# Patient Record
Sex: MALE | Race: BLACK OR AFRICAN AMERICAN | Employment: OTHER | URBAN - METROPOLITAN AREA
[De-identification: names, ages, dates, MRNs, and addresses within clinical notes are randomized per-mention and may not be internally consistent; named-entity substitution may affect disease eponyms.]

---

## 2017-03-01 ENCOUNTER — HOSPITAL ENCOUNTER (OUTPATIENT)
Dept: INFUSION THERAPY | Age: 77
Discharge: HOME | End: 2017-03-01
Payer: MEDICARE

## 2017-03-01 ENCOUNTER — OFFICE VISIT (OUTPATIENT)
Dept: ONCOLOGY | Age: 77
End: 2017-03-01

## 2017-03-01 VITALS
DIASTOLIC BLOOD PRESSURE: 83 MMHG | RESPIRATION RATE: 16 BRPM | HEART RATE: 59 BPM | TEMPERATURE: 98 F | SYSTOLIC BLOOD PRESSURE: 148 MMHG

## 2017-03-01 VITALS
WEIGHT: 164.6 LBS | BODY MASS INDEX: 23.56 KG/M2 | SYSTOLIC BLOOD PRESSURE: 198 MMHG | OXYGEN SATURATION: 97 % | HEART RATE: 60 BPM | TEMPERATURE: 97.5 F | RESPIRATION RATE: 20 BRPM | HEIGHT: 70 IN | DIASTOLIC BLOOD PRESSURE: 100 MMHG

## 2017-03-01 DIAGNOSIS — D72.819 LEUKOPENIA, UNSPECIFIED TYPE: ICD-10-CM

## 2017-03-01 DIAGNOSIS — D69.6 THROMBOCYTOPENIA (HCC): Primary | ICD-10-CM

## 2017-03-01 LAB
ALBUMIN SERPL BCP-MCNC: 3.9 G/DL (ref 3.5–5)
ALBUMIN/GLOB SERPL: 1.2 {RATIO} (ref 1.1–2.2)
ALP SERPL-CCNC: 81 U/L (ref 45–117)
ALT SERPL-CCNC: 38 U/L (ref 12–78)
ANION GAP BLD CALC-SCNC: 5 MMOL/L (ref 5–15)
APTT PPP: 28.6 SEC (ref 22.1–32.5)
AST SERPL W P-5'-P-CCNC: 26 U/L (ref 15–37)
BASOPHILS # BLD AUTO: 0 K/UL (ref 0–0.1)
BASOPHILS # BLD: 1 % (ref 0–1)
BILIRUB SERPL-MCNC: 0.7 MG/DL (ref 0.2–1)
BUN SERPL-MCNC: 15 MG/DL (ref 6–20)
BUN/CREAT SERPL: 15 (ref 12–20)
CALCIUM SERPL-MCNC: 9 MG/DL (ref 8.5–10.1)
CHLORIDE SERPL-SCNC: 104 MMOL/L (ref 97–108)
CO2 SERPL-SCNC: 31 MMOL/L (ref 21–32)
CREAT SERPL-MCNC: 1.01 MG/DL (ref 0.7–1.3)
CRP SERPL-MCNC: <0.29 MG/DL
EOSINOPHIL # BLD: 0.1 K/UL (ref 0–0.4)
EOSINOPHIL NFR BLD: 3 % (ref 0–7)
ERYTHROCYTE [DISTWIDTH] IN BLOOD BY AUTOMATED COUNT: 12.8 % (ref 11.5–14.5)
ERYTHROCYTE [SEDIMENTATION RATE] IN BLOOD: 8 MM/HR (ref 0–20)
FIBRINOGEN PPP-MCNC: 329 MG/DL (ref 200–475)
FOLATE SERPL-MCNC: 28.7 NG/ML (ref 5–21)
GLOBULIN SER CALC-MCNC: 3.3 G/DL (ref 2–4)
GLUCOSE SERPL-MCNC: 91 MG/DL (ref 65–100)
HCT VFR BLD AUTO: 41.1 % (ref 36.6–50.3)
HGB BLD-MCNC: 13 G/DL (ref 12.1–17)
INR PPP: 1.2 (ref 0.9–1.1)
LYMPHOCYTES # BLD AUTO: 32 % (ref 12–49)
LYMPHOCYTES # BLD: 1.3 K/UL (ref 0.8–3.5)
MCH RBC QN AUTO: 30.1 PG (ref 26–34)
MCHC RBC AUTO-ENTMCNC: 31.6 G/DL (ref 30–36.5)
MCV RBC AUTO: 95.1 FL (ref 80–99)
MONOCYTES # BLD: 0.3 K/UL (ref 0–1)
MONOCYTES NFR BLD AUTO: 8 % (ref 5–13)
NEUTS SEG # BLD: 2.3 K/UL (ref 1.8–8)
NEUTS SEG NFR BLD AUTO: 56 % (ref 32–75)
PLATELET # BLD AUTO: 107 K/UL (ref 150–400)
POTASSIUM SERPL-SCNC: 3.9 MMOL/L (ref 3.5–5.1)
PROT SERPL-MCNC: 7.2 G/DL (ref 6.4–8.2)
PROTHROMBIN TIME: 11.7 SEC (ref 9–11.1)
RBC # BLD AUTO: 4.32 M/UL (ref 4.1–5.7)
SODIUM SERPL-SCNC: 140 MMOL/L (ref 136–145)
THERAPEUTIC RANGE,PTTT: NORMAL SECS (ref 58–77)
VIT B12 SERPL-MCNC: 1725 PG/ML (ref 211–911)
WBC # BLD AUTO: 4.2 K/UL (ref 4.1–11.1)

## 2017-03-01 PROCEDURE — 85384 FIBRINOGEN ACTIVITY: CPT | Performed by: INTERNAL MEDICINE

## 2017-03-01 PROCEDURE — 80074 ACUTE HEPATITIS PANEL: CPT | Performed by: INTERNAL MEDICINE

## 2017-03-01 PROCEDURE — 82607 VITAMIN B-12: CPT | Performed by: INTERNAL MEDICINE

## 2017-03-01 PROCEDURE — 87389 HIV-1 AG W/HIV-1&-2 AB AG IA: CPT | Performed by: INTERNAL MEDICINE

## 2017-03-01 PROCEDURE — 36415 COLL VENOUS BLD VENIPUNCTURE: CPT | Performed by: INTERNAL MEDICINE

## 2017-03-01 PROCEDURE — 85652 RBC SED RATE AUTOMATED: CPT | Performed by: INTERNAL MEDICINE

## 2017-03-01 PROCEDURE — 82746 ASSAY OF FOLIC ACID SERUM: CPT | Performed by: INTERNAL MEDICINE

## 2017-03-01 PROCEDURE — 86038 ANTINUCLEAR ANTIBODIES: CPT | Performed by: INTERNAL MEDICINE

## 2017-03-01 PROCEDURE — 80053 COMPREHEN METABOLIC PANEL: CPT | Performed by: INTERNAL MEDICINE

## 2017-03-01 PROCEDURE — 86140 C-REACTIVE PROTEIN: CPT | Performed by: INTERNAL MEDICINE

## 2017-03-01 PROCEDURE — 85025 COMPLETE CBC W/AUTO DIFF WBC: CPT | Performed by: INTERNAL MEDICINE

## 2017-03-01 PROCEDURE — 85730 THROMBOPLASTIN TIME PARTIAL: CPT | Performed by: INTERNAL MEDICINE

## 2017-03-01 PROCEDURE — 85610 PROTHROMBIN TIME: CPT | Performed by: INTERNAL MEDICINE

## 2017-03-01 PROCEDURE — 86677 HELICOBACTER PYLORI ANTIBODY: CPT | Performed by: INTERNAL MEDICINE

## 2017-03-01 RX ORDER — METOPROLOL TARTRATE 100 MG/1
TABLET ORAL 2 TIMES DAILY
COMMUNITY
End: 2019-04-02

## 2017-03-01 RX ORDER — LISINOPRIL 2.5 MG/1
TABLET ORAL DAILY
COMMUNITY
End: 2019-04-02

## 2017-03-01 RX ORDER — ATORVASTATIN CALCIUM 10 MG/1
TABLET, FILM COATED ORAL
COMMUNITY

## 2017-03-01 RX ORDER — ASPIRIN 81 MG/1
TABLET ORAL DAILY
COMMUNITY
End: 2017-03-29 | Stop reason: ALTCHOICE

## 2017-03-01 NOTE — PROGRESS NOTES
[address] [facility] [place] at [facility]  [phone]    Hematology / Oncology Consult    Reason for Visit:   Scott Perdomo is a 68 y.o. male who is seen in consultation at the request of Dr. Angeles Andrews for evaluation of leukopenia, thrombocytopenia. History of Present Illness:   Scott Perdomo is a pleasant 68 y.o. male who presents today for evaluation of thrombocytopenia and leukopenia. He reports first hearing about low platelets about 5 years ago, but has not had any evaluation for this previously. He saw his PCP recently for a routine physical, and labwork demonstrated mildly low platelets. Repeat testing confirmed this. He reports good energy. No fevers, chills, night sweats, adenopathy. He did recently have shingles, since resolved. No bleeding. No rash. He has been eating a stricter vegetarian diet and has lost 7-8 pounds as a result. He takes B12 supplements, as well as other supplements. Denies etoh. Denies family history of hematologic disorders. Past Medical History:   Diagnosis Date    CAD (coronary artery disease)     HTN (hypertension)       Past Surgical History:   Procedure Laterality Date    CARDIAC SURG PROCEDURE UNLIST        Social History   Substance Use Topics    Smoking status: Never Smoker    Smokeless tobacco: Not on file    Alcohol use No      History reviewed. No pertinent family history. Current Outpatient Prescriptions   Medication Sig    metoprolol tartrate (LOPRESSOR) 100 mg IR tablet Take  by mouth two (2) times a day.  lisinopril (PRINIVIL, ZESTRIL) 2.5 mg tablet Take  by mouth daily.  aspirin delayed-release 81 mg tablet Take  by mouth daily.  atorvastatin (LIPITOR) 10 mg tablet Take  by mouth daily. No current facility-administered medications for this visit. No Known Allergies     Review of Systems: A complete review of systems was obtained, negative except as described above.     Physical Exam:     Visit Vitals    BP (!) 198/100 (BP 1 Location: Right arm, BP Patient Position: Sitting)    Pulse 60    Temp 97.5 °F (36.4 °C) (Axillary)    Resp 20    Ht 5' 10\" (1.778 m)    Wt 164 lb 9.6 oz (74.7 kg)    SpO2 97%    BMI 23.62 kg/m2     General: No distress  Eyes: PERRLA, anicteric sclerae  HENT: Atraumatic, OP clear  Neck: Supple  Lymphatic: No cervical, supraclavicular, or inguinal adenopathy  Respiratory: CTAB, normal respiratory effort  CV: Normal rate, regular rhythm, no murmurs, no peripheral edema  GI: Soft, nontender, nondistended, no masses, no hepatomegaly, no splenomegaly  MS: Normal gait and station. Digits without clubbing or cyanosis. Skin: No rashes, ecchymoses, or petechiae. Normal temperature, turgor, and texture. Psych: Alert, oriented, appropriate affect, normal judgment/insight    Results:     2/12/2017:  WBC: 3.69  HGB: 15  PLT: 125    2/6/2017:  WBC: 4.35  HGB: 14.2  PLT: 116  Creatinine: 0.9  TBili: 0.7  PSA: 1.174    Records reviewed and summarized above. Assessment:   1) Thrombocytopenia  Mild, uncertain etiology. The differential diagnosis for thrombocytopenia is broad, and can be generally broken down into three groups: Decreased Production, Increased Consumption, or Pseudothrombocytopenia. Causes of decreased production include infection, HIV, etoh toxicity, B12 and folate deficiency, and primary bone marrow disorders. Causes of increased consumption include various drugs, DIC, TTP, ITP, APLA, splenomegaly, and physical destruction related to valvular disease, aneurysms, etc.  Pseudothrombocytopenia is a laboratory artifact and can be ruled out by a smear review. I will start by obtaining some labs to explore some of these possibilities. I will additional order an abdominal US to evaluate the liver and spleen.       2) Leukopenia  The most common etiology, especially in patients without recurrent infections, would be benign ethnic neutropenia, as s commonly have a lower WBC than Caucasians. There is no evidence by history of an inherited neutropenia, no history of chronic or recurrent infection, and no medication that is likely to be contributing. I will explore some other possibilities today with labwork. Plan:     · Labs today  · US abdomen  · Return to see me in a few weeks to review results    I appreciate the opportunity to participate in Mr. Justice Frias's care.     Signed By: Ashlee Philip MD     March 1, 2017

## 2017-03-01 NOTE — MR AVS SNAPSHOT
Visit Information Date & Time Provider Department Dept. Phone Encounter #  
 3/1/2017 11:00 AM MD Albin Gonzáles Oncology at [facility] [phone] Follow-up Instructions Return for nithin Go results, tcp fu. Your Appointments 3/29/2017 11:00 AM  
ESTABLISHED PATIENT with MD Albin Gonzáles Oncology at [facility]) Appt Note: Donavan, lab results, tcp fu  
 [address] [facility], [address] [facility] [address]  
[phone]  
  
   
 [address] [facility], [address] [facility] Upcoming Health Maintenance Date Due DTaP/Tdap/Td series (1 - Tdap) 3/28/1961 ZOSTER VACCINE AGE 60> 3/28/2000 GLAUCOMA SCREENING Q2Y 3/28/2005 Pneumococcal 65+ Low/Medium Risk (1 of 2 - PCV13) 3/28/2005 MEDICARE YEARLY EXAM 3/28/2005 INFLUENZA AGE 9 TO ADULT 8/1/2016 Allergies as of 3/1/2017  Review Complete On: 3/1/2017 By: Ami Quintero No Known Allergies Current Immunizations  Never Reviewed No immunizations on file. Not reviewed this visit You Were Diagnosed With   
  
 Codes Comments Thrombocytopenia ([facility]    -  Primary ICD-10-CM: D69.6 ICD-9-CM: 287.5 Leukopenia, unspecified type     ICD-10-CM: D72.819 ICD-9-CM: 288.50 Vitals BP  
  
  
  
  
  
 (!) 198/100 (BP 1 Location: Right arm, BP Patient Position: Sitting) Vitals History BMI and BSA Data Body Mass Index Body Surface Area  
 23.62 kg/m 2 1.92 m 2 Preferred Pharmacy Pharmacy Name Phone [facility]in[facility] Strickland [address], [address] [phone] Your Updated Medication List  
  
   
This list is accurate as of: 3/1/17 12:19 PM.  Always use your most recent med list.  
  
  
  
  
 aspirin delayed-release 81 mg tablet Take  by mouth daily. LIPITOR 10 mg tablet Generic drug:  atorvastatin Take  by mouth daily. lisinopril 2.5 mg tablet Commonly known as:  Carey Escort Take  by mouth daily. metoprolol tartrate 100 mg IR tablet Commonly known as:  LOPRESSOR Take  by mouth two (2) times a day. Follow-up Instructions Return for Donavan, lab results, tcp fu. To-Do List   
 Around 03/03/2017 Imaging:  US ABD COMP Introducing [facility] [place] & HEALTH SERVICES! [facility] introduces Beamly patient portal. Now you can access parts of your medical record, email your doctor's office, and request medication refills online. 1. In your internet browser, go to [email or web address] 2. Click on the First Time User? Click Here link in the Sign In box. You will see the New Member Sign Up page. 3. Enter your Beamly Access Code exactly as it appears below. You will not need to use this code after youve completed the sign-up process. If you do not sign up before the expiration date, you must request a new code. · Beamly Access Code: [email or web address] Expires: 5/30/2017 12:19 PM 
 
4. Enter the last four digits of your Social Security Number (xxxx) and Date of Birth (mm/dd/yyyy) as indicated and click Submit. You will be taken to the next sign-up page. 5. Create a Beamly ID. This will be your Beamly login ID and cannot be changed, so think of one that is secure and easy to remember. 6. Create a Beamly password. You can change your password at any time. 7. Enter your Password Reset Question and Answer. This can be used at a later time if you forget your password. 8. Enter your e-mail address. You will receive e-mail notification when new information is available in [address] 9. Click Sign Up. You can now view and download portions of your medical record. 10. Click the Download Summary menu link to download a portable copy of your medical information.  
 
If you have questions, please visit the Frequently Asked Questions section of the [employer]. Remember, [employer] is NOT to be used for urgent needs. For medical emergencies, dial 911. Now available from your iPhone and Android! Please provide this summary of care documentation to your next provider. Your primary care clinician is listed as Laxmi Hayes. If you have any questions after today's visit, please call [phone].

## 2017-03-03 LAB
ANA TITR SER IF: NEGATIVE {TITER}
H PYLORI IGA SER-ACNC: <9 UNITS (ref 0–8.9)
H PYLORI IGG SER IA-ACNC: 1.6 U/ML (ref 0–0.8)
HAV IGM SERPL QL IA: NONREACTIVE
HBV CORE IGM SER QL: NONREACTIVE
HBV SURFACE AG SER QL: <0.1 INDEX
HBV SURFACE AG SER QL: NEGATIVE
HCV AB SERPL QL IA: NONREACTIVE
HCV COMMENT,HCGAC: NORMAL
HIV 1+2 AB+HIV1 P24 AG SERPL QL IA: NONREACTIVE
HIV12 RESULT COMMENT, HHIVC: NORMAL
PERIPHERAL SMEAR,PSM: NORMAL
SP1: NORMAL
SP2: NORMAL
SP3: NORMAL

## 2017-03-15 ENCOUNTER — HOSPITAL ENCOUNTER (OUTPATIENT)
Dept: ULTRASOUND IMAGING | Age: 77
Discharge: HOME | End: 2017-03-15
Attending: INTERNAL MEDICINE
Payer: MEDICARE

## 2017-03-15 DIAGNOSIS — D72.819 LEUKOPENIA, UNSPECIFIED TYPE: ICD-10-CM

## 2017-03-15 DIAGNOSIS — D69.6 THROMBOCYTOPENIA (HCC): ICD-10-CM

## 2017-03-15 PROCEDURE — 76700 US EXAM ABDOM COMPLETE: CPT

## 2017-03-29 ENCOUNTER — OFFICE VISIT (OUTPATIENT)
Dept: ONCOLOGY | Age: 77
End: 2017-03-29

## 2017-03-29 ENCOUNTER — TELEPHONE (OUTPATIENT)
Dept: ONCOLOGY | Age: 77
End: 2017-03-29

## 2017-03-29 VITALS
HEART RATE: 67 BPM | OXYGEN SATURATION: 98 % | RESPIRATION RATE: 20 BRPM | DIASTOLIC BLOOD PRESSURE: 101 MMHG | BODY MASS INDEX: 23.05 KG/M2 | HEIGHT: 70 IN | SYSTOLIC BLOOD PRESSURE: 188 MMHG | WEIGHT: 161 LBS | TEMPERATURE: 97.5 F

## 2017-03-29 DIAGNOSIS — D69.6 THROMBOCYTOPENIA (HCC): Primary | ICD-10-CM

## 2017-03-29 DIAGNOSIS — I48.91 ATRIAL FIBRILLATION, UNSPECIFIED TYPE (HCC): Primary | ICD-10-CM

## 2017-03-29 DIAGNOSIS — D72.819 LEUKOPENIA, UNSPECIFIED TYPE: ICD-10-CM

## 2017-03-29 DIAGNOSIS — A04.8 H. PYLORI INFECTION: ICD-10-CM

## 2017-03-29 RX ORDER — CLARITHROMYCIN 500 MG/1
500 TABLET, FILM COATED ORAL 2 TIMES DAILY
Qty: 28 TAB | Refills: 0 | Status: SHIPPED | OUTPATIENT
Start: 2017-03-29 | End: 2017-04-12

## 2017-03-29 RX ORDER — AMOXICILLIN 500 MG/1
1000 CAPSULE ORAL 2 TIMES DAILY
Qty: 56 CAP | Refills: 0 | Status: SHIPPED | OUTPATIENT
Start: 2017-03-29 | End: 2017-04-12

## 2017-03-29 RX ORDER — OMEPRAZOLE 20 MG/1
20 CAPSULE, DELAYED RELEASE ORAL 2 TIMES DAILY
Qty: 28 CAP | Refills: 0 | Status: SHIPPED | OUTPATIENT
Start: 2017-03-29 | End: 2017-04-12

## 2017-03-29 NOTE — TELEPHONE ENCOUNTER
[facility]  Medical Oncology at [facility]    03/29/17- Per Dr. Ayush Martin, there's a drug interaction between apixaban and clarithromycin. Need to find out why patient is taking apixaban. Voicemail left on both home and cell numbers requesting a return call from patient when available. 03/30/17- Attempted to call patient/wife again, voicemail left on home phone. No voicemail set up on patient's wife's home number, unable to leave a message. Dr. Ayush Martin notified.

## 2017-03-29 NOTE — MR AVS SNAPSHOT
Visit Information Date & Time Provider Department Dept. Phone Encounter #  
 3/29/2017 11:00 AM MD Michelle Torresnhaverosana Oncology at [address] [facility] [id number] Follow-up Instructions Return for Raddin, tcp fu, leukopenia fu. Your Appointments 7/6/2017 10:00 AM  
ESTABLISHED PATIENT with MD Albin Torres Oncology at [address] [facility] [address] [place] [address]) Appt Note: 3 mo fu, Raddin [address] [facility], [address] [facility] [address] [phone]  
  
   
 [address] [facility], [address] [place] [address] Upcoming Health Maintenance Date Due DTaP/Tdap/Td series (1 - Tdap) 3/28/1961 ZOSTER VACCINE AGE 60> 3/28/2000 GLAUCOMA SCREENING Q2Y 3/28/2005 Pneumococcal 65+ High/Highest Risk (1 of 2 - PCV13) 3/28/2005 MEDICARE YEARLY EXAM 3/28/2005 INFLUENZA AGE 9 TO ADULT 8/1/2016 Allergies as of 3/29/2017  Review Complete On: 3/29/2017 By: Claudia Ro LPN No Known Allergies Current Immunizations  Never Reviewed No immunizations on file. Not reviewed this visit You Were Diagnosed With   
  
 Codes Comments Thrombocytopenia ([facility]    -  Primary ICD-10-CM: D69.6 ICD-9-CM: 287.5 Leukopenia, unspecified type     ICD-10-CM: D72.819 ICD-9-CM: 288.50 H. pylori infection     ICD-10-CM: A04.8 ICD-9-CM: 041.86 Vitals BP Pulse Temp Resp Height(growth percentile) (!) 188/101 (BP 1 Location: Left arm, BP Patient Position: Sitting) 67 97.5 °F (36.4 °C) (Temporal) 20 5' 10\" (1.778 m) Weight(growth percentile) SpO2 BMI Smoking Status 161 lb (73 kg) 98% 23.1 kg/m2 Never Smoker Vitals History BMI and BSA Data Body Mass Index Body Surface Area  
 23.1 kg/m 2 1.9 m 2 Preferred Pharmacy Pharmacy Name Phone Luci Hives [address], [address] [facility], [place] [phone] Your Updated Medication List  
  
   
 This list is accurate as of: 3/29/17 12:25 PM.  Always use your most recent med list.  
  
  
  
  
 ELIQUIS 5 mg tablet Generic drug:  apixaban Take 5 mg by mouth two (2) times a day. LIPITOR 10 mg tablet Generic drug:  atorvastatin Take  by mouth daily. lisinopril 2.5 mg tablet Commonly known as:  Jacklynn Pares Take  by mouth daily. metoprolol tartrate 100 mg IR tablet Commonly known as:  LOPRESSOR Take  by mouth two (2) times a day. We Performed the Following CBC WITH AUTOMATED DIFF [82961 CPT(R)] Follow-up Instructions Return for Raddin, tcp fu, leukopenia fu. Introducing [facility] & HEALTH SERVICES! [employer] introduces EzyInsights patient portal. Now you can access parts of your medical record, email your doctor's office, and request medication refills online. 1. In your internet browser, go to [email or web address] 2. Click on the First Time User? Click Here link in the Sign In box. You will see the New Member Sign Up page. 3. Enter your EzyInsights Access Code exactly as it appears below. You will not need to use this code after youve completed the sign-up process. If you do not sign up before the expiration date, you must request a new code. · EzyInsights Access Code: [email or web address] Expires: 5/30/2017  1:19 PM 
 
4. Enter the last four digits of your Social Security Number (xxxx) and Date of Birth (mm/dd/yyyy) as indicated and click Submit. You will be taken to the next sign-up page. 5. Create a Carlotzt ID. This will be your EzyInsights login ID and cannot be changed, so think of one that is secure and easy to remember. 6. Create a EzyInsights password. You can change your password at any time. 7. Enter your Password Reset Question and Answer. This can be used at a later time if you forget your password. 8. Enter your e-mail address. You will receive e-mail notification when new information is available in [address] 9. Click Sign Up. You can now view and download portions of your medical record. 10. Click the Download Summary menu link to download a portable copy of your medical information. If you have questions, please visit the Frequently Asked Questions section of the Rapt website. Remember, [employer] is NOT to be used for urgent needs. For medical emergencies, dial 911. Now available from your iPhone and Android! Please provide this summary of care documentation to your next provider. Your primary care clinician is listed as Jewels Ivey. If you have any questions after today's visit, please call [phone].

## 2017-03-29 NOTE — PROGRESS NOTES
[address] [facility] Oncology at [address] [place] [address]  [phone]    Hematology / Oncology Followup    Reason for Visit:   Kimberley Pugh is a 68 y.o. male who is seen for follow up of leukopenia, thrombocytopenia. History of Present Illness:   He is feeling pretty well. Good energy. No fevers, chills, night sweats, weight loss, adenopathy. No bleeding. PAST HISTORY: The following sections were reviewed and updated in the EMR as appropriate: PMH, SH, FH, Medications, Allergies. No Known Allergies   Review of Systems: A complete review of systems was obtained, reviewed, and scanned into the EMR. Pertinent findings reviewed above. Physical Exam:     Visit Vitals    BP (!) 188/101 (BP 1 Location: Left arm, BP Patient Position: Sitting)  Comment: .  Pulse 67    Temp 97.5 °F (36.4 °C) (Temporal)    Resp 20    Ht 5' 10\" (1.778 m)    Wt 161 lb (73 kg)    SpO2 98%    BMI 23.1 kg/m2     General: No distress  Eyes: PERRLA, anicteric sclerae  HENT: Atraumatic, OP clear  Neck: Supple  Lymphatic: No cervical, supraclavicular, or inguinal adenopathy  Respiratory: CTAB, normal respiratory effort  CV: Normal rate, regular rhythm, no murmurs, no peripheral edema  GI: Soft, nontender, nondistended, no masses, no hepatomegaly, no splenomegaly  MS: Normal gait and station. Digits without clubbing or cyanosis. Skin: No rashes, ecchymoses, or petechiae. Normal temperature, turgor, and texture. Psych: Alert, oriented, appropriate affect, normal judgment/insight    Results:     Lab Results   Component Value Date/Time    WBC 4.2 03/01/2017 01:58 PM    HGB 13.0 03/01/2017 01:58 PM    HCT 41.1 03/01/2017 01:58 PM    PLATELET 311 87/51/4310 01:58 PM    MCV 95.1 03/01/2017 01:58 PM    ABS.  NEUTROPHILS 2.3 03/01/2017 01:58 PM     Lab Results   Component Value Date/Time    Sodium 140 03/01/2017 01:58 PM    Potassium 3.9 03/01/2017 01:58 PM    Chloride 104 03/01/2017 01:58 PM    CO2 31 03/01/2017 01:58 PM Glucose 91 03/01/2017 01:58 PM    BUN 15 03/01/2017 01:58 PM    Creatinine 1.01 03/01/2017 01:58 PM    GFR est AA >60 03/01/2017 01:58 PM    GFR est non-AA >60 03/01/2017 01:58 PM    Calcium 9.0 03/01/2017 01:58 PM     Lab Results   Component Value Date/Time    Bilirubin, total 0.7 03/01/2017 01:58 PM    ALT (SGPT) 38 03/01/2017 01:58 PM    AST (SGOT) 26 03/01/2017 01:58 PM    Alk. phosphatase 81 03/01/2017 01:58 PM    Protein, total 7.2 03/01/2017 01:58 PM    Albumin 3.9 03/01/2017 01:58 PM    Globulin 3.3 03/01/2017 01:58 PM     Lab Results   Component Value Date/Time    Vitamin B12 1725 03/01/2017 01:58 PM    Folate 28.7 03/01/2017 01:58 PM    Sed rate, automated 8 03/01/2017 01:58 PM    C-Reactive protein <0.29 03/01/2017 01:58 PM    Hep C  virus Ab Interp. NONREACTIVE 03/01/2017 01:58 PM     Lab Results   Component Value Date/Time    INR 1.2 03/01/2017 01:58 PM    aPTT 28.6 03/01/2017 01:58 PM    Fibrinogen 329 03/01/2017 01:58 PM       2/12/2017:  WBC: 3.69  HGB: 15  PLT: 125    2/6/2017:  WBC: 4.35  HGB: 14.2  PLT: 116  Creatinine: 0.9  TBili: 0.7  PSA: 1.174    3/1/2017:  Smear: Normocytic normochromic RBC's. Few reactive lymphocytes. Mild thrombocytopenia. US abdomen 3/15/2017: No evidence of hepatosplenomegaly. Incidentally noted small right pleural effusion. Assessment:   1) Thrombocytopenia  Possibly ITP. Labwork unremarkable, with the exception of possible H pylori, which has been known to be associated with thrombocytopenia. We discussed treating this with triple therapy and seeing if it makes a difference for his platelets. We additionally discussed the option of pursuing a bone marrow biopsy to rule out a primary bone marrow issue, given that he has two cell lines effected. We will hold on this and see if his labs respond to [facility] management.     2) Leukopenia  Technically normal on our lab check, but low end of normal.  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 We discussed the option of obtaining a bone marrow biopsy, versus surveillance. Will survey for now, as above. 3) H Pylori  Positive ab screen. He is having some UGI symptoms. I will treat, in part to see if these symptoms improve, but also to see if his PLT improve. There is an interaction between clarithromycin and his apixaban, we will look into whether his apixaban can be temporarily held during his treatment. Alternatively, dose of apixaban should be temporarily reduced. 4) HTN  He reports it has been better at home.  I advised him to discuss with his PCP and cardiologist.    Plan:     · Triple therapy for H pylori  · CBC in 3-4 months  · Return to see me in 3-4 months      Signed By: Camille Sanz MD     March 29, 2017

## 2017-03-30 NOTE — TELEPHONE ENCOUNTER
3:57 PM- Patient returned phone call he reported he was started on eliquis about 15 days ago by  ( cardiologist) \" for stroke prevention\". Advised him returned phone call will be made with updates. Voicemail left for nurse at [address] [place] [address] office [phone]- needing to confirm why medication was started. 03/31/17- Spoke to Tommy Cruz, [address] [place] [address] nurse she reported patient is on eliquis for a-fib- she reported \" he should absolutely not hold medication for two weeks- you all should change antibiotic\". Will inform .

## 2017-03-31 RX ORDER — DABIGATRAN ETEXILATE 150 MG/1
150 CAPSULE ORAL EVERY 12 HOURS
Qty: 28 CAP | Refills: 0 | Status: SHIPPED | OUTPATIENT
Start: 2017-03-31 | End: 2017-04-14

## 2017-03-31 RX ORDER — NITAZOXANIDE 500 MG/1
500 TABLET ORAL 2 TIMES DAILY WITH MEALS
Qty: 20 TAB | Refills: 0 | Status: SHIPPED | OUTPATIENT
Start: 2017-03-31 | End: 2017-03-31 | Stop reason: ALTCHOICE

## 2017-03-31 NOTE — TELEPHONE ENCOUNTER
[employer]  Medical Oncology at [facility]    His clarithromycin interacts with his apixaban, potentially increasing levels of apixaban. His cardiologist does not want him to hold the apixaban for the 2 weeks he needs treatment for his H pylori. Unfortunately, most regimens for H pylori include clarithromycin. I did find a study which instead used omeprazole, low dose levofloxacin, doxycycline and nitazoxanide with good results. We will see if his insurance will cover these agents.

## 2017-03-31 NOTE — TELEPHONE ENCOUNTER
His insurance does not cover the nitazoxanide, which will be about $600. An alternative option is to proceed with original triple therapy as prescribed, but switch his apixaban to pradaxa for the two weeks. No interaction with this combination. I called patient and he is willing to try this if it's affordable. We will send script to his pharmacy.

## 2017-03-31 NOTE — TELEPHONE ENCOUNTER
[address] [facility] Oncology at [address] [place] [address]    03/31/17- Pradaxa prescription would be $300, co-pay card used for assistance to get 30 day supply for $0.  Patient notified and instructed to take pradaxa (instead of eliquis) during the time he's on the antibiotic regimen. Patient verbalized understanding, but stated he will need to wait to  prescriptions until 4/12, as that's when he'll receive his social security. Dr. Regina Bethea notified.

## 2017-04-14 ENCOUNTER — TELEPHONE (OUTPATIENT)
Dept: ONCOLOGY | Age: 77
End: 2017-04-14

## 2017-04-14 NOTE — TELEPHONE ENCOUNTER
[address] [facility]   Medical Oncology at [address] [facility] informed pt unable to afford triple therapy for treatment of h.pylori and was asked to assist pt with financial barrier to care. CHIP Liz and informed of pricing:   Biaxin $45  Prilosec $2  Amoxicillin $3.64    Pharmacist also informed me that pt has not picked up Pradaxa ($0). Pt will need to be on this med while on triple therapy. Obtained [employer] assistance for Biaxin and faxed voucher to [employer] Donia Aschoff). Called pt to inform him of this; no answer. Left message with contact info and request for pt to return call so that we can discuss plan. CHIP Liz and informed pharmacist of plan. [address] will move forward with filling pt's prescriptions today. Will try to reach pt today. Dr. Tanvi Juarez and Karena Bartlett, RN informed.

## 2017-04-20 ENCOUNTER — TELEPHONE (OUTPATIENT)
Dept: ONCOLOGY | Age: 77
End: 2017-04-20

## 2017-04-20 NOTE — TELEPHONE ENCOUNTER
Yusuf PINA [facility] Oncology at [facility]    Attempt to call pt to see if he has picked up meds and if there were any other barriers to care. Pt did not answer; left message on both home and cell phone. Will continue to try to reach pt. Addendum:  Pt returned my call. He reports he picked up his medicine from [address] [facility] last Friday night and started taking as prescribed. Pt reports he stopped taking the Eliquis and is taking the Pradaxa for the duration of taking the triple therapy as prescribed by Dr. April Clinton. Pt reports he has a \"llittle cough\" and is coughing up phlegm. Pt wanted to know what to do about this. Pt reports this started a \"week or so ago. \" Explained to pt that this was reviewed with Dr. April Clinton and that if cough persists, pt should call PCP to be seen as this is not due to the medicines pt has started. Pt voiced understanding and states he will call PCP next week if cough persists.

## 2017-07-06 ENCOUNTER — HOSPITAL ENCOUNTER (OUTPATIENT)
Dept: INFUSION THERAPY | Age: 77
Discharge: HOME | End: 2017-07-06
Payer: MEDICARE

## 2017-07-06 ENCOUNTER — OFFICE VISIT (OUTPATIENT)
Dept: ONCOLOGY | Age: 77
End: 2017-07-06

## 2017-07-06 VITALS
RESPIRATION RATE: 16 BRPM | HEIGHT: 70 IN | DIASTOLIC BLOOD PRESSURE: 109 MMHG | HEART RATE: 58 BPM | WEIGHT: 164 LBS | OXYGEN SATURATION: 98 % | TEMPERATURE: 96.2 F | BODY MASS INDEX: 23.48 KG/M2 | SYSTOLIC BLOOD PRESSURE: 196 MMHG

## 2017-07-06 VITALS
SYSTOLIC BLOOD PRESSURE: 204 MMHG | RESPIRATION RATE: 18 BRPM | HEART RATE: 63 BPM | TEMPERATURE: 98 F | DIASTOLIC BLOOD PRESSURE: 98 MMHG

## 2017-07-06 DIAGNOSIS — D69.6 THROMBOCYTOPENIA (HCC): Primary | ICD-10-CM

## 2017-07-06 LAB
BASOPHILS # BLD AUTO: 0 K/UL (ref 0–0.1)
BASOPHILS # BLD: 1 % (ref 0–1)
EOSINOPHIL # BLD: 0.2 K/UL (ref 0–0.4)
EOSINOPHIL NFR BLD: 4 % (ref 0–7)
ERYTHROCYTE [DISTWIDTH] IN BLOOD BY AUTOMATED COUNT: 12.9 % (ref 11.5–14.5)
HCT VFR BLD AUTO: 42.2 % (ref 36.6–50.3)
HGB BLD-MCNC: 14.1 G/DL (ref 12.1–17)
LYMPHOCYTES # BLD AUTO: 34 % (ref 12–49)
LYMPHOCYTES # BLD: 1.5 K/UL (ref 0.8–3.5)
MCH RBC QN AUTO: 30.8 PG (ref 26–34)
MCHC RBC AUTO-ENTMCNC: 33.4 G/DL (ref 30–36.5)
MCV RBC AUTO: 92.1 FL (ref 80–99)
MONOCYTES # BLD: 0.3 K/UL (ref 0–1)
MONOCYTES NFR BLD AUTO: 6 % (ref 5–13)
NEUTS SEG # BLD: 2.5 K/UL (ref 1.8–8)
NEUTS SEG NFR BLD AUTO: 55 % (ref 32–75)
PLATELET # BLD AUTO: 112 K/UL (ref 150–400)
RBC # BLD AUTO: 4.58 M/UL (ref 4.1–5.7)
WBC # BLD AUTO: 4.5 K/UL (ref 4.1–11.1)

## 2017-07-06 PROCEDURE — 85025 COMPLETE CBC W/AUTO DIFF WBC: CPT | Performed by: INTERNAL MEDICINE

## 2017-07-06 PROCEDURE — 36415 COLL VENOUS BLD VENIPUNCTURE: CPT | Performed by: INTERNAL MEDICINE

## 2017-07-06 NOTE — PROGRESS NOTES
Phlebotomist GARCIA notified RN of patients elevated BP. RN noted that the patient had just been seen and assessed in MD office, with elevated BP. Patient declined pain, SOB, or headache. RN did manual BP- 186 98.  RN instructed pt on s/s stroke and when to call the MD. Pt states BP always elevated at MD and normal at home is in the 140/80

## 2017-07-06 NOTE — PROGRESS NOTES
[id number] [facility] at [facility]  [phone]    Hematology / Oncology Followup    Reason for Visit:   Viral Hughes is a 68 y.o. male who is seen for follow up of leukopenia, thrombocytopenia. History of Present Illness:   After I saw him last, he took abx per his Hpylori. This took some coordination due to cost issues and interaction issues, but he reports managing to complete the course and tolerating it well. Abdominal discomfort has resolved. No bleeding. No fevers, chills, night sweats, weight loss, adenopathy. Energy is good, exercising regularly. PAST HISTORY: The following sections were reviewed and updated in the EMR as appropriate: PMH, SH, FH, Medications, Allergies. No Known Allergies   Review of Systems: A complete review of systems was obtained, reviewed, and scanned into the EMR. Pertinent findings reviewed above. Physical Exam:     Visit Vitals    BP (!) 196/109 (BP 1 Location: Left arm, BP Patient Position: Supine)  Comment: .  Pulse (!) 58    Temp 96.2 °F (35.7 °C) (Temporal)    Resp 16    Ht 5' 10\" (1.778 m)    Wt 164 lb (74.4 kg)    SpO2 98%    BMI 23.53 kg/m2     General: No distress  Eyes: PERRLA, anicteric sclerae  HENT: Atraumatic, OP clear  Neck: Supple  Lymphatic: No cervical, supraclavicular, or inguinal adenopathy  Respiratory: CTAB, normal respiratory effort  CV: Normal rate, regular rhythm, no murmurs, no peripheral edema  GI: Soft, nontender, nondistended, no masses, no hepatomegaly, no splenomegaly  MS: Normal gait and station. Digits without clubbing or cyanosis. Skin: No rashes, ecchymoses, or petechiae. Normal temperature, turgor, and texture.   Psych: Alert, oriented, appropriate affect, normal judgment/insight    Results:     Lab Results   Component Value Date/Time    WBC 4.5 07/06/2017 11:03 AM    HGB 14.1 07/06/2017 11:03 AM    HCT 42.2 07/06/2017 11:03 AM    PLATELET 167 82/64/9088 11:03 AM    MCV 92.1 07/06/2017 11:03 AM    ABS. NEUTROPHILS 2.5 07/06/2017 11:03 AM     Lab Results   Component Value Date/Time    Sodium 140 03/01/2017 01:58 PM    Potassium 3.9 03/01/2017 01:58 PM    Chloride 104 03/01/2017 01:58 PM    CO2 31 03/01/2017 01:58 PM    Glucose 91 03/01/2017 01:58 PM    BUN 15 03/01/2017 01:58 PM    Creatinine 1.01 03/01/2017 01:58 PM    GFR est AA >60 03/01/2017 01:58 PM    GFR est non-AA >60 03/01/2017 01:58 PM    Calcium 9.0 03/01/2017 01:58 PM     Lab Results   Component Value Date/Time    Bilirubin, total 0.7 03/01/2017 01:58 PM    ALT (SGPT) 38 03/01/2017 01:58 PM    AST (SGOT) 26 03/01/2017 01:58 PM    Alk. phosphatase 81 03/01/2017 01:58 PM    Protein, total 7.2 03/01/2017 01:58 PM    Albumin 3.9 03/01/2017 01:58 PM    Globulin 3.3 03/01/2017 01:58 PM     Lab Results   Component Value Date/Time    Vitamin B12 1725 03/01/2017 01:58 PM    Folate 28.7 03/01/2017 01:58 PM    Sed rate, automated 8 03/01/2017 01:58 PM    C-Reactive protein <0.29 03/01/2017 01:58 PM    Hep C  virus Ab Interp. NONREACTIVE 03/01/2017 01:58 PM     Lab Results   Component Value Date/Time    INR 1.2 03/01/2017 01:58 PM    aPTT 28.6 03/01/2017 01:58 PM    Fibrinogen 329 03/01/2017 01:58 PM       2/12/2017:  WBC: 3.69  HGB: 15  PLT: 125    2/6/2017:  WBC: 4.35  HGB: 14.2  PLT: 116  Creatinine: 0.9  TBili: 0.7  PSA: 1.174    3/1/2017:  Smear: Normocytic normochromic RBC's. Few reactive lymphocytes. Mild thrombocytopenia. US abdomen 3/15/2017: No evidence of hepatosplenomegaly. Incidentally noted small right pleural effusion. 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 Laboratory evaluation has otherwise been unremarkable. No significant improvement despite treatment of Hpylori. I cannot rule out a primary bone marrow disorder at this time, but the stability is reassuring. Platelets currently adequate for hemostasis, no indication for treatment at this time.   We will continue to monitor, consider bone marrow biopsy if counts worsen significantly. 2) Leukopenia  Resolved, low-end of normal range today and last check in March. Labwork unremarkable. 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 Monitor. 3) H Pylori  Positive ab screen. S/p triple therapy. UGI symptoms have resolved.     4) HTN  High again today, I again encouraged him to follow up with his PCP and cardiologist.    Plan:     · Labs in 4 months: CBC with diff  · Return to see me in 4 months      Signed By: Cris Gomez MD     July 6, 2017

## 2017-07-06 NOTE — PROGRESS NOTES
[facility] LAB NOTE    Pt  ARRIVED AT 1102 LEFT AT 1107time)    Blood pressure (!) 204/98, pulse 63, temperature 98 °F (36.7 °C), resp. rate 18.       Labs drawn peripherally as ordered    Lab will be in connect care  Blood was high I notify Kaye (RN)

## 2017-11-06 ENCOUNTER — TELEPHONE (OUTPATIENT)
Dept: ONCOLOGY | Age: 77
End: 2017-11-06

## 2017-11-06 NOTE — TELEPHONE ENCOUNTER
[address] at [place]  [phone]    11/06/17- Phone call placed to pt to remind pt to have labs drawn prior to his follow up appointment with .  left for patient.

## 2017-11-14 ENCOUNTER — OFFICE VISIT (OUTPATIENT)
Dept: ONCOLOGY | Age: 77
End: 2017-11-14

## 2017-11-14 ENCOUNTER — HOSPITAL ENCOUNTER (OUTPATIENT)
Dept: INFUSION THERAPY | Age: 77
Discharge: HOME | End: 2017-11-14
Payer: MEDICARE

## 2017-11-14 VITALS
HEART RATE: 66 BPM | RESPIRATION RATE: 20 BRPM | BODY MASS INDEX: 23.77 KG/M2 | SYSTOLIC BLOOD PRESSURE: 158 MMHG | HEIGHT: 70 IN | TEMPERATURE: 96 F | DIASTOLIC BLOOD PRESSURE: 78 MMHG | WEIGHT: 166 LBS | OXYGEN SATURATION: 98 %

## 2017-11-14 DIAGNOSIS — D69.6 THROMBOCYTOPENIA (HCC): Primary | ICD-10-CM

## 2017-11-14 LAB
BASOPHILS # BLD: 0.1 K/UL (ref 0–0.1)
BASOPHILS NFR BLD: 1 % (ref 0–1)
EOSINOPHIL # BLD: 0.4 K/UL (ref 0–0.4)
EOSINOPHIL NFR BLD: 9 % (ref 0–7)
ERYTHROCYTE [DISTWIDTH] IN BLOOD BY AUTOMATED COUNT: 12.5 % (ref 11.5–14.5)
HCT VFR BLD AUTO: 43.8 % (ref 36.6–50.3)
HGB BLD-MCNC: 15.1 G/DL (ref 12.1–17)
LYMPHOCYTES # BLD: 1.5 K/UL (ref 0.8–3.5)
LYMPHOCYTES NFR BLD: 33 % (ref 12–49)
MCH RBC QN AUTO: 31.6 PG (ref 26–34)
MCHC RBC AUTO-ENTMCNC: 34.5 G/DL (ref 30–36.5)
MCV RBC AUTO: 91.6 FL (ref 80–99)
MONOCYTES # BLD: 0.3 K/UL (ref 0–1)
MONOCYTES NFR BLD: 7 % (ref 5–13)
NEUTS SEG # BLD: 2.2 K/UL (ref 1.8–8)
NEUTS SEG NFR BLD: 50 % (ref 32–75)
PLATELET # BLD AUTO: 120 K/UL (ref 150–400)
RBC # BLD AUTO: 4.78 M/UL (ref 4.1–5.7)
WBC # BLD AUTO: 4.4 K/UL (ref 4.1–11.1)

## 2017-11-14 PROCEDURE — 85025 COMPLETE CBC W/AUTO DIFF WBC: CPT | Performed by: INTERNAL MEDICINE

## 2017-11-14 PROCEDURE — 36415 COLL VENOUS BLD VENIPUNCTURE: CPT | Performed by: INTERNAL MEDICINE

## 2017-11-14 NOTE — MR AVS SNAPSHOT
Visit Information Date & Time Provider Department Dept. Phone Encounter #  
 11/14/2017 10:00 AM Danisha Snider MD [facility] at [address] [place] [address] Rd [id number] Upcoming Health Maintenance Date Due DTaP/Tdap/Td series (1 - Tdap) 3/28/1961 ZOSTER VACCINE AGE 60> 1/28/2000 GLAUCOMA SCREENING Q2Y 3/28/2005 Pneumococcal 65+ High/Highest Risk (1 of 2 - PCV13) 3/28/2005 MEDICARE YEARLY EXAM 3/28/2005 Influenza Age 5 to Adult 8/1/2017 Allergies as of 11/14/2017  Review Complete On: 11/14/2017 By: Danisha Snider MD  
 No Known Allergies Current Immunizations  Never Reviewed No immunizations on file. Not reviewed this visit You Were Diagnosed With   
  
 Codes Comments Thrombocytopenia ([facility] [address]    -  Primary ICD-10-CM: D69.6 ICD-9-CM: 287.5 Vitals BP Pulse Temp Resp Height(growth percentile) Weight(growth percentile) 158/78 (BP 1 Location: Left arm, BP Patient Position: Sitting) 66 96 °F (35.6 °C) (Temporal) 20 5' 10\" (1.778 m) 166 lb (75.3 kg) SpO2 BMI Smoking Status 98% 23.82 kg/m2 Never Smoker Vitals History BMI and BSA Data Body Mass Index Body Surface Area  
 23.82 kg/m 2 1.93 m 2 Preferred Pharmacy Pharmacy Name Phone Flavia Ramirez [phone] [phone] Your Updated Medication List  
  
   
This list is accurate as of: 11/14/17 10:55 AM.  Always use your most recent med list.  
  
  
  
  
 ELIQUIS 5 mg tablet Generic drug:  apixaban Take 5 mg by mouth two (2) times a day. LIPITOR 10 mg tablet Generic drug:  atorvastatin Take  by mouth daily. lisinopril 2.5 mg tablet Commonly known as:  Schmid Vasiliy Take  by mouth daily. metoprolol tartrate 100 mg IR tablet Commonly known as:  LOPRESSOR Take  by mouth two (2) times a day. We Performed the Following CBC WITH AUTOMATED DIFF [73588 CPT(R)] Introducing [facility]! Demetrio Von introduces [employer] patient portal. Now you can access parts of your medical record, email your doctor's office, and request medication refills online. 1. In your internet browser, go to [email or web address] 2. Click on the First Time User? Click Here link in the Sign In box. You will see the New Member Sign Up page. 3. Enter your [employer] Access Code exactly as it appears below. You will not need to use this code after youve completed the sign-up process. If you do not sign up before the expiration date, you must request a new code. · [employer] Access Code: [email or web address] Expires: 1/20/2018  7:48 AM 
 
4. Enter the last four digits of your Social Security Number (xxxx) and Date of Birth (mm/dd/yyyy) as indicated and click Submit. You will be taken to the next sign-up page. 5. Create a [employer] ID. This will be your [employer] login ID and cannot be changed, so think of one that is secure and easy to remember. 6. Create a [employer] password. You can change your password at any time. 7. Enter your Password Reset Question and Answer. This can be used at a later time if you forget your password. 8. Enter your e-mail address. You will receive e-mail notification when new information is available in [address] 9. Click Sign Up. You can now view and download portions of your medical record. 10. Click the Download Summary menu link to download a portable copy of your medical information. If you have questions, please visit the Frequently Asked Questions section of the [employer] website. Remember, [employer] is NOT to be used for urgent needs. For medical emergencies, dial 911. Now available from your iPhone and Android! Please provide this summary of care documentation to your next provider. Your primary care clinician is listed as Donal Ding  If you have any questions after today's visit, please call [phone].

## 2017-11-14 NOTE — PROGRESS NOTES
[facility] [place] at [facility]  [address] [facility], [address] [place] [address]  Lindsey Forward: [phone]  F: [phone]      Reason for Visit:   Michel De León is a 68 y.o. male who is seen for follow up of leukopenia, thrombocytopenia. History of Present Illness:   He reports feeling well. Good energy. No GERD. Complaint with his apixaban, tolerating it well, no bleeding. No fevers, chills, night sweats, unintentional weight loss, adenopathy. No recent infections. PAST HISTORY: The following sections were reviewed and updated in the EMR as appropriate: PMH, SH, FH, Medications, Allergies. No Known Allergies   Review of Systems: A complete review of systems was obtained, reviewed, and scanned into the EMR. Pertinent findings reviewed above. Physical Exam:     Visit Vitals    /78 (BP 1 Location: Left arm, BP Patient Position: Sitting)  Comment: .  Pulse 66    Temp 96 °F (35.6 °C) (Temporal)    Resp 20    Ht 5' 10\" (1.778 m)    Wt 166 lb (75.3 kg)    SpO2 98%    BMI 23.82 kg/m2     General: No distress  Eyes: PERRLA, anicteric sclerae  HENT: Atraumatic, OP clear  Neck: Supple  Lymphatic: No cervical, supraclavicular, or inguinal adenopathy  Respiratory: CTAB, normal respiratory effort  CV: Normal rate, regular rhythm, no murmurs, no peripheral edema  GI: Soft, nontender, nondistended, no masses, no hepatomegaly, no splenomegaly  MS: Normal gait and station. Digits without clubbing or cyanosis. Skin: No rashes, ecchymoses, or petechiae. Normal temperature, turgor, and texture. Psych: Alert, oriented, appropriate affect, normal judgment/insight    Results:     Lab Results   Component Value Date/Time    WBC 4.4 11/14/2017 11:20 AM    HGB 15.1 11/14/2017 11:20 AM    HCT 43.8 11/14/2017 11:20 AM    PLATELET 032 12/62/0964 11:20 AM    MCV 91.6 11/14/2017 11:20 AM    ABS.  NEUTROPHILS 2.2 11/14/2017 11:20 AM     Lab Results   Component Value Date/Time    Sodium 140 03/01/2017 01:58 PM    Potassium 3.9 03/01/2017 01:58 PM    Chloride 104 03/01/2017 01:58 PM    CO2 31 03/01/2017 01:58 PM    Glucose 91 03/01/2017 01:58 PM    BUN 15 03/01/2017 01:58 PM    Creatinine 1.01 03/01/2017 01:58 PM    GFR est AA >60 03/01/2017 01:58 PM    GFR est non-AA >60 03/01/2017 01:58 PM    Calcium 9.0 03/01/2017 01:58 PM     Lab Results   Component Value Date/Time    Bilirubin, total 0.7 03/01/2017 01:58 PM    ALT (SGPT) 38 03/01/2017 01:58 PM    AST (SGOT) 26 03/01/2017 01:58 PM    Alk. phosphatase 81 03/01/2017 01:58 PM    Protein, total 7.2 03/01/2017 01:58 PM    Albumin 3.9 03/01/2017 01:58 PM    Globulin 3.3 03/01/2017 01:58 PM     Lab Results   Component Value Date/Time    Vitamin B12 1725 03/01/2017 01:58 PM    Folate 28.7 03/01/2017 01:58 PM    Sed rate, automated 8 03/01/2017 01:58 PM    C-Reactive protein <0.29 03/01/2017 01:58 PM    Hep C  virus Ab Interp. NONREACTIVE 03/01/2017 01:58 PM     Lab Results   Component Value Date/Time    INR 1.2 03/01/2017 01:58 PM    aPTT 28.6 03/01/2017 01:58 PM    Fibrinogen 329 03/01/2017 01:58 PM     PLATELET (K/uL)   Date Value   11/14/2017 120 (L)   07/06/2017 112 (L)   03/01/2017 107 (L)         2/12/2017:  WBC: 3.69  HGB: 15  PLT: 125    2/6/2017:  WBC: 4.35  HGB: 14.2  PLT: 116  Creatinine: 0.9  TBili: 0.7  PSA: 1.174    3/1/2017:  Smear: Normocytic normochromic RBC's. Few reactive lymphocytes. Mild thrombocytopenia. US abdomen 3/15/2017: No evidence of hepatosplenomegaly. Incidentally noted small right pleural effusion. Assessment:   1) Thrombocytopenia  Stable. Likely ITP. Laboratory evaluation has otherwise been unremarkable. No significant improvement despite treatment of Hpylori. I cannot rule out a primary bone marrow disorder at this time, but the stability is reassuring. Platelets remain adequate for hemostasis, no indication for treatment at this time.   We will continue to monitor, consider bone marrow biopsy if counts worsen significantly. 2) Leukopenia  Resolved, though WBC remains on low-end of normal range. Labwork unremarkable. 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 Monitor. 3) H Pylori  Positive ab screen. S/p triple therapy. UGI symptoms have resolved. 4) HTN  Remains a bit high. Follow up with PCP.     Plan:     · Labs today: CBC  · Labs in 6 months: CBC with diff  · Return to see me in 6 months      Signed By: Austin Messer MD     November 14, 2017

## 2017-11-14 NOTE — PROGRESS NOTES
There were no vitals taken for this visit. Pt arrived at 1119,labs drawn peripherally from right [facility]. Pt tolerated well and left at 1123.

## 2018-05-08 ENCOUNTER — TELEPHONE (OUTPATIENT)
Dept: ONCOLOGY | Age: 78
End: 2018-05-08

## 2018-05-08 NOTE — TELEPHONE ENCOUNTER
[address] at [facility]  [phone]    05/08/18- Phone call placed to pt to remind pt to have labs drawn prior to his follow up appointment with .  left for patient.

## 2018-06-11 ENCOUNTER — TELEPHONE (OUTPATIENT)
Dept: ONCOLOGY | Age: 78
End: 2018-06-11

## 2018-06-11 NOTE — TELEPHONE ENCOUNTER
[address] at [facility]  [phone]    06/11/18- Phone call placed to pt to remind pt to have labs drawn prior to his follow up appointment with .  left for patient.

## 2018-06-15 ENCOUNTER — TELEPHONE (OUTPATIENT)
Dept: ONCOLOGY | Age: 78
End: 2018-06-15

## 2018-06-15 NOTE — TELEPHONE ENCOUNTER
Patient called and stated that he needed to cancel his appointment. Patient stated that he does not have the money for the blood work or the copay for the office visit. Informed patient that the copay can be billed if needed for the office visit and that he could always talk with our financial consoler. Patient verbalized understanding and stated that he still could not pay for the lab work and would call our office at a later time to reschedule.

## 2018-06-15 NOTE — TELEPHONE ENCOUNTER
Left voicemail requesting a return call from patient to discuss Celeste Santiago' financial assistance program.

## 2019-04-02 RX ORDER — METOPROLOL SUCCINATE 200 MG/1
200 TABLET, EXTENDED RELEASE ORAL DAILY
COMMUNITY

## 2019-04-02 RX ORDER — VALSARTAN AND HYDROCHLOROTHIAZIDE 320; 12.5 MG/1; MG/1
1 TABLET, FILM COATED ORAL DAILY
COMMUNITY

## 2019-04-02 RX ORDER — AMLODIPINE BESYLATE 10 MG/1
TABLET ORAL DAILY
Status: ON HOLD | COMMUNITY
End: 2019-04-08

## 2019-04-02 NOTE — PERIOP NOTES
[facility] Preprocedure Instructions 1. On the day of your surgery, please report to registration located on the 2nd floor of the  [facility]. yes 2. You must have a responsible adult to drive you to the hospital, stay at the hospital during your procedure and drive you home. If they leave your procedure will not be started (no exceptions). yes 3. Do not have anything to eat or drink (including water, gum, mints, coffee, and juice) after midnight. This does not apply to the medications you were instructed to take by your physician. yesIf you are currently taking Plavix, Coumadin, Aspirin, or other blood-thinning agents, contact your physician for special instructions. yes, 
 
4. If you are having a procedure that requires bowel prep: We recommend that you have only clear liquids the day before your procedure and begin your bowel prep by 5:00 pm.  You may continue to drink clear liquids until midnight. If for any reason you are not able to complete your prep please notify your physician immediately. yes 5. Have a list of all current medications, including vitamins, herbal supplements and any other over the counter medications. yes 6. If you wear glasses, contacts, dentures and/or hearing aids, they may be removed prior to procedure, please bring a case to store them in. yes 7. You should understand that if you do not follow these instructions your procedure may be cancelled. If your physical condition changes (I.e. fever, cold or flu) please contact your doctor as soon as possible. 8. It is important that you be on time. If for any reason you are unable to keep your appointment please call [phone] the day of or your physicians office prior to your scheduled procedure

## 2019-04-08 ENCOUNTER — HOSPITAL ENCOUNTER (OUTPATIENT)
Age: 79
Setting detail: OUTPATIENT SURGERY
Discharge: HOME | End: 2019-04-08
Attending: INTERNAL MEDICINE | Admitting: INTERNAL MEDICINE
Payer: MEDICARE

## 2019-04-08 ENCOUNTER — ANESTHESIA (OUTPATIENT)
Dept: ENDOSCOPY | Age: 79
End: 2019-04-08
Payer: MEDICARE

## 2019-04-08 ENCOUNTER — ANESTHESIA EVENT (OUTPATIENT)
Dept: ENDOSCOPY | Age: 79
End: 2019-04-08
Payer: MEDICARE

## 2019-04-08 VITALS
SYSTOLIC BLOOD PRESSURE: 140 MMHG | HEART RATE: 68 BPM | TEMPERATURE: 98.1 F | BODY MASS INDEX: 26.05 KG/M2 | DIASTOLIC BLOOD PRESSURE: 72 MMHG | HEIGHT: 70 IN | RESPIRATION RATE: 29 BRPM | WEIGHT: 182 LBS | OXYGEN SATURATION: 98 %

## 2019-04-08 PROCEDURE — 74011250636 HC RX REV CODE- 250/636: Performed by: INTERNAL MEDICINE

## 2019-04-08 PROCEDURE — 76040000019: Performed by: INTERNAL MEDICINE

## 2019-04-08 PROCEDURE — 76060000031 HC ANESTHESIA FIRST 0.5 HR: Performed by: INTERNAL MEDICINE

## 2019-04-08 PROCEDURE — 74011250636 HC RX REV CODE- 250/636

## 2019-04-08 PROCEDURE — 88305 TISSUE EXAM BY PATHOLOGIST: CPT

## 2019-04-08 PROCEDURE — 77030013992 HC SNR POLYP ENDOSC BSC -B: Performed by: INTERNAL MEDICINE

## 2019-04-08 PROCEDURE — 77030010936 HC CLP LIG BSC -C: Performed by: INTERNAL MEDICINE

## 2019-04-08 RX ORDER — FLUMAZENIL 0.1 MG/ML
0.2 INJECTION INTRAVENOUS
Status: DISCONTINUED | OUTPATIENT
Start: 2019-04-08 | End: 2019-04-08 | Stop reason: HOSPADM

## 2019-04-08 RX ORDER — PROPOFOL 10 MG/ML
INJECTION, EMULSION INTRAVENOUS
Status: DISCONTINUED | OUTPATIENT
Start: 2019-04-08 | End: 2019-04-08 | Stop reason: HOSPADM

## 2019-04-08 RX ORDER — DEXTROMETHORPHAN/PSEUDOEPHED 2.5-7.5/.8
1.2 DROPS ORAL
Status: DISCONTINUED | OUTPATIENT
Start: 2019-04-08 | End: 2019-04-08 | Stop reason: HOSPADM

## 2019-04-08 RX ORDER — MIDAZOLAM HYDROCHLORIDE 1 MG/ML
.25-5 INJECTION, SOLUTION INTRAMUSCULAR; INTRAVENOUS
Status: DISCONTINUED | OUTPATIENT
Start: 2019-04-08 | End: 2019-04-08 | Stop reason: HOSPADM

## 2019-04-08 RX ORDER — SODIUM CHLORIDE 9 MG/ML
50 INJECTION, SOLUTION INTRAVENOUS CONTINUOUS
Status: DISCONTINUED | OUTPATIENT
Start: 2019-04-08 | End: 2019-04-08 | Stop reason: HOSPADM

## 2019-04-08 RX ORDER — ATROPINE SULFATE 0.1 MG/ML
0.4 INJECTION INTRAVENOUS
Status: DISCONTINUED | OUTPATIENT
Start: 2019-04-08 | End: 2019-04-08 | Stop reason: HOSPADM

## 2019-04-08 RX ORDER — EPINEPHRINE 0.1 MG/ML
1 INJECTION INTRACARDIAC; INTRAVENOUS
Status: DISCONTINUED | OUTPATIENT
Start: 2019-04-08 | End: 2019-04-08 | Stop reason: HOSPADM

## 2019-04-08 RX ORDER — NALOXONE HYDROCHLORIDE 0.4 MG/ML
0.4 INJECTION, SOLUTION INTRAMUSCULAR; INTRAVENOUS; SUBCUTANEOUS
Status: DISCONTINUED | OUTPATIENT
Start: 2019-04-08 | End: 2019-04-08 | Stop reason: HOSPADM

## 2019-04-08 RX ORDER — PROPOFOL 10 MG/ML
INJECTION, EMULSION INTRAVENOUS AS NEEDED
Status: DISCONTINUED | OUTPATIENT
Start: 2019-04-08 | End: 2019-04-08 | Stop reason: HOSPADM

## 2019-04-08 RX ADMIN — PROPOFOL 100 MCG/KG/MIN: 10 INJECTION, EMULSION INTRAVENOUS at 14:10

## 2019-04-08 RX ADMIN — SODIUM CHLORIDE: 9 INJECTION, SOLUTION INTRAVENOUS at 14:07

## 2019-04-08 RX ADMIN — PROPOFOL 50 MG: 10 INJECTION, EMULSION INTRAVENOUS at 14:10

## 2019-04-08 NOTE — PERIOP NOTES
0634 
Anesthesia staff at patient's bedside administering anesthesia and monitoring patients vital signs throughout procedure. See anesthesia note. [address] [facility] Endoscope was pre-cleaned at bedside immediately following procedure by Telma Preston endo tech.  Patient tolerated procedure without problems. Abdomen soft and patient arousable and voices no complaints Report received from CRNA, see anesthesia note. Patient transported to endoscopy recovery area. Report given to MARA PETE RN.

## 2019-04-08 NOTE — ANESTHESIA PREPROCEDURE EVALUATION
Relevant Problems No relevant active problems Anesthetic History No history of anesthetic complications Review of Systems / Medical History Patient summary reviewed Pulmonary Sleep apnea: CPAP Neuro/Psych Cardiovascular Hypertension: well controlled Exercise tolerance: >4 METS 
  
GI/Hepatic/Renal 
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 
 
Airway Mallampati: III Neck ROM: normal range of motion Comments: Small mouth opening Cardiovascular Rhythm: regular Rate: normal 
 
 
 
 Dental 
No notable dental hx Pulmonary Breath sounds clear to auscultation Abdominal 
 
 
 
 Other Findings Anesthetic Plan ASA: 3 Anesthesia type: MAC Anesthetic plan and risks discussed with: Patient Informed consent obtained.

## 2019-04-08 NOTE — PROCEDURES
Marlene Frederick M.D.  [phone]            2019          Colonoscopy Operative Report  John Zafar  :  1940  [facility] Medical Record Number:  [id number]      Indications:    Screening colonoscopy     :  Troy Paulson MD    Referring Provider: Rosa Holm MD    Sedation:  MAC anesthesia    Pre-Procedural Exam:      Airway: clear,  No airway problems anticipated  Heart: RRR, without gallops or rubs  Lungs: clear bilaterally without wheezes, crackles, or rhonchi  Abdomen: soft, nontender, nondistended, bowel sounds present  Mental Status: awake, alert and oriented to person, place and time     Procedure Details:  After informed consent was obtained with all risks and benefits of procedure explained and preoperative exam completed, the patient was taken to the endoscopy suite and placed in the left lateral decubitus position. Upon sequential sedation as per above, a digital rectal exam was performed. The Olympus videocolonoscope  was inserted in the rectum and carefully advanced to the cecum, which was identified by the ileocecal valve and appendiceal orifice. The quality of preparation was good. The colonoscope was slowly withdrawn with careful inspection and evaluation between folds. Retroflexion in the rectum was performed. Findings:   Terminal Ileum: not intubated  Cecum: 1  Sessile polyp(s), the largest 3 mm in size;  Ascending Colon: 1  Sessile polyp(s), the largest 4 mm in size;  Transverse Colon: normal  Descending Colon: normal  Sigmoid: normal  Rectum: no mucosal lesion appreciated  Grade 1 internal hemorrhoid(s); Interventions:  2 complete polypectomy were performed using cold and hot snare  and the polyps were  retrieved. One clip placed at the polypectomy site.     Specimen Removed:  specimen #1, 3 mm in size, located in the cecum removed by cold snare and retrieved for pathology  #2, 4 mm in size, located in the ascending colon removed by snare cautery and retrieved for pathology    Complications: None. EBL:  None. Impression:  Two small polyps removed and sent to pathology,otherwise mucosa within normal.                         Small internal hemorrhoids    Recommendations:  -Await pathology.   -High fiber diet.    -Resume normal medication(s). -Resume Eliquis in 2 days  -Based on his age, no need for repeat colonoscopy for screening purposes    Discharge Disposition:  Home in the company of a  when able to ambulate.     Michael Ramos MD  4/8/2019  2:35 PM

## 2019-04-08 NOTE — H&P
The patient is a 66year old male who presents for a screening colonscopy. The patient presents for a screening colonoscopy evaluation (He feels well and has no complaints today. He takes eliquis for CAD and has had cardiac surgery in the past.). There has been no associated melena, hematochezia, weight loss, anorexia, diarrhea, constipation, fever or abdominal pain. Problem List/Past Medical [facility]; 2/26/2019 11:15 AM) Hernia (553.9  K46. 9)   
Hypertension   
 
Past Surgical History ([facility]; 2/26/2019 11:15 AM) H/O heart bypass surgery (V45.81  Z95.1)  [2015]: Allergies [facility]; 2/26/2019 11:15 AM) No Known Drug Allergies  [04/10/2018]: 
No Known Allergies  [04/10/2018]: Medication History [facility]; 2/26/2019 11:16 AM) AmLODIPine Besylate  (10MG Tablet, Oral Daily) Active. Atorvastatin Calcium  (1 tablet Oral at bedtime) Specific strength unknown - Active. Eliquis  (5MG Tablet, 1 tablet Oral twice a day) Active. Metoprolol Succinate ER  (200MG Tablet ER 24HR, 1 tablet Oral daily) Active. Valsartan-Hydrochlorothiazide  (320-12.5MG Tablet, 1 tablet Oral daily) Active. Medications Reconciled  
 
Family History ([facility]; 2/26/2019 11:15 AM) Heart attack (410.90  I21.9)   Mother, First Degree Relatives. Hypertension   Mother, First Degree Relatives. Social History [facility]; 2/26/2019 11:15 AM) Blood Transfusion   No. 
Alcohol Use   Has never drank. Employment status   Retired. Marital status   . Tobacco Use   Never smoker. Diagnostic Studies History [facility]; 2/26/2019 11:15 AM) Colonoscopy   
 
Health Maintenance History [facility]; 2/26/2019 11:15 AM) Pneumovax  [2018]: Flu Vaccine  [2018]: 
 
 
 
Review of Systems ([facility]; 2/26/2019 11:15 AM) General Not Present- Chronic Fatigue, Poor Appetite, Weight Gain and Weight Loss. Skin Not Present- Itching, Rash and Skin Color Changes. HEENT Present- Hearing Loss. Not Present- Vertigo. Respiratory Not Present- Difficulty Breathing and TB exposure. Cardiovascular Present- Hypertension. Not Present- Chest Pain, Use of Antibiotics before Dental Procedures and Use of Blood Thinners. Gastrointestinal Present- See HPI. Musculoskeletal Not Present- Arthritis, Hip Replacement Surgery and Knee Replacement Surgery. Neurological Not Present- Weakness. Psychiatric Not Present- Depression. Endocrine Not Present- Diabetes and Thyroid Problems. Hematology Not Present- Anemia. Vitals Gavin Fitzpatrick; 2/26/2019 11:15 AM) 2/26/2019 11:14 AM 
Weight: 190 lb   Height: 70 in  
Body Surface Area: 2.04 m²   Body Mass Index: 27.26 kg/m²   
BP: 130/80 (Sitting, Left Arm, Standard) Physical Exam (Franco Saunders; 2/26/2019 4:38 PM) General 
Mental Status - Alert. General Appearance - Cooperative, Pleasant, Not in acute distress. Orientation - Oriented X3. Integumentary General Characteristics Color - normal coloration of skin. Head and Neck Neck Global Assessment - supple. Eye 
Sclera/Conjunctiva - Bilateral - No Jaundice. Chest and Lung Exam 
Chest and lung exam reveals  - quiet, even and easy respiratory effort with no use of accessory muscles. Auscultation Breath sounds - Normal. Adventitious sounds - No Adventitious sounds. Cardiovascular Auscultation Rhythm - Regular, No Tachycardia, No Bradycardia . Heart Sounds - Normal heart sounds , S1 WNL and S2 WNL, No S3, No Summation Gallop. Murmurs & Other Heart Sounds - Auscultation of the heart reveals - No Murmurs. Abdomen Palpation/Percussion Tenderness - Non-Tender. Rebound tenderness - No rebound. Rigidity (guarding) - No Rigidity. Dullness to percussion - No abnormal dullness to percussion. Liver - No hepatosplenomegaly. Abdominal Mass Palpable - No masses. Other Characteristics - No Ascites. Auscultation Auscultation of the abdomen reveals - Bowel sounds normal, No Abdominal bruits and No Succussion splash. Rectal - Did not examine. Peripheral Vascular Lower Extremity Palpation - Edema - Left - No edema. Right - No edema. Neurologic Motor Involuntary Movements - Asterixis - not present. Assessment & Plan (Franco Green; 2/28/2019 2:24 PM) Screening for colon cancer (V76.51  Z12.11) Story: Pt presents for screening colonoscopy evaluation. Takes eliquis for hx of heart disease and cardiac surgery. Impression: Gave him sample of suprep. I have advised him to hold xarelto for 2 days prior to procedure, and asked Dr Chiu Smart office fo fax their approval of holding anticoagulation. Caroline Rivera Current Plans Colonoscopy (21528) (Discussed risks and benefits with the patient to include:; perforation, post polypectomy, or post biopsy bleeding, missed lesions, and sedation reactions.) Pt Education - How to access health information online: discussed with patient and provided information. Signed electronically by LOGAN Rodrigez (2/28/2019 2:25 PM) Co-signed electronically by Herberth Calhoun MD (3/4/2019 9:19 AM)

## 2019-04-08 NOTE — PROGRESS NOTES
[facility] 1940 
[id number] Situation: 
Verbal report received from:   Lanette Hauser Procedure: Procedure(s): 
COLONOSCOPY 
ENDOSCOPIC POLYPECTOMY RESOLUTION CLIP Background: 
 
Preoperative diagnosis: SCREENING Postoperative diagnosis: Polyps, hemorrhoids. :  Dr. Alice Luong Assistant(s): Endoscopy Technician-1: Antoinette Cruz Endoscopy RN-1: Nuvia Childers RN Specimens:  
ID Type Source Tests Collected by Time Destination 1 : cecum polyp Preservative Cecum  Kaye Collier MD 4/8/2019 1423 Pathology 2 : ascending colon polyp Preservative Colon, Ascending  Kaye Collier MD 4/8/2019 1425 Pathology H. Pylori  no Assessment: 
Intra-procedure medications Anesthesia gave intra-procedure sedation and medications, see anesthesia flow sheet yes [facility]ous [facility] Vital signs stable   yes Abdominal assessment: round and soft   yes Recommendation: 
Discharge patient per MD order  yes. Return to floor  outpatient Family or Friend   spouse Permission to share finding with family or friend yes

## 2019-04-08 NOTE — DISCHARGE INSTRUCTIONS
[facility]. Mindi Thompson M.D.  [phone]            COLON DISCHARGE INSTRUCTIONS       2019    Ned Boyle  :  1940  [place] Medical Record Number:  [id number]      COLONOSCOPY FINDINGS:  Your colonoscopy showed: Two diminutive polyps removed and sent to pathology, otherwise mucosa within normal.    DISCOMFORT:  Redness at IV site- apply warm compress to area; if redness or soreness persist- contact your physician  There may be a slight amount of blood passed from the rectum  Gaseous discomfort- walking, belching will help relieve any discomfort  You may not operate a vehicle for 12 hours  You may not engage in an occupation involving machinery or appliances for rest of today  You may not drink alcoholic beverages for at least 12 hours  Avoid making any critical decisions for at least 24 hour  DIET:   High fiber diet. - however -  remember your colon is empty and a heavy meal will produce gas. Avoid these foods:  vegetables, fried / greasy foods, carbonated drinks for today     ACTIVITY:  You may resume your normal daily activities it is recommended that you spend the remainder of the day resting -  avoid any strenuous activity. CALL M.DTrish ANY SIGN OF:   Increasing pain, nausea, vomiting  Abdominal distension (swelling)  New increased bleeding (oral or rectal)  Fever (chills)  Pain in chest area  Bloody discharge from nose or mouth   Shortness of breath    Follow-up Instructions:   Call Dr. Reji Jack if any questions or problems.    Telephone # [phone]  Biopsy results will be available in  5 to 7 days  Resume Eliquis in 2 days

## 2019-04-10 NOTE — ANESTHESIA POSTPROCEDURE EVALUATION
Procedure(s): 
COLONOSCOPY 
ENDOSCOPIC POLYPECTOMY RESOLUTION CLIP. MAC Anesthesia Post Evaluation Multimodal analgesia: multimodal analgesia used between 6 hours prior to anesthesia start to PACU discharge Patient location during evaluation: bedside Patient participation: complete - patient participated Level of consciousness: awake Pain management: adequate Airway patency: patent Anesthetic complications: no 
Cardiovascular status: acceptable Respiratory status: acceptable Hydration status: acceptable Vitals Value Taken Time /72 4/8/2019  2:52 PM  
Temp 36.7 °C (98.1 °F) 4/8/2019  2:37 PM  
Pulse 68 4/8/2019  2:52 PM  
Resp 29 4/8/2019  2:52 PM  
SpO2 98 % 4/8/2019  2:52 PM

## 2022-04-06 NOTE — PROGRESS NOTES
11/15/17- Informed pt of results he verbalized understanding. No further questions or concerns. Spironolactone Pregnancy And Lactation Text: This medication can cause feminization of the male fetus and should be avoided during pregnancy. The active metabolite is also found in breast milk.

## (undated) DEVICE — REM POLYHESIVE ADULT PATIENT RETURN ELECTRODE: Brand: VALLEYLAB

## (undated) DEVICE — Device

## (undated) DEVICE — 1200 GUARD II KIT W/5MM TUBE W/O VAC TUBE: Brand: GUARDIAN

## (undated) DEVICE — CANN NASAL O2 CAPNOGRAPHY AD -- FILTERLINE

## (undated) DEVICE — BAG SPEC BIOHZRD 10 X 10 IN --

## (undated) DEVICE — SIMPLICITY FLUFF UNDERPAD 23X36, MODERATE: Brand: SIMPLICITY

## (undated) DEVICE — POLYP TRAP: Brand: TRAPEASE®

## (undated) DEVICE — BAG BELONG PT PERS CLEAR HANDL

## (undated) DEVICE — SNARE ENDOSCP M L240CM W27MM SHTH DIA2.4MM CHN 2.8MM OVL

## (undated) DEVICE — CATH IV AUTOGRD BC PNK 20GA 25 -- INSYTE

## (undated) DEVICE — CONTAINER SPEC 20 ML LID NEUT BUFF FORMALIN 10 % POLYPR STS

## (undated) DEVICE — SET GRAV CK VLV NEEDLESS ST 3 GANGED 4WAY STPCOCK HI FLO 10

## (undated) DEVICE — ADULT SPO2 SENSOR: Brand: NELLCOR

## (undated) DEVICE — KIT COLON W/ 1.1OZ LUB AND 2 END

## (undated) DEVICE — 3M™ CUROS™ DISINFECTING CAP FOR NEEDLELESS CONNECTORS 270/CARTON 20 CARTONS/CASE CFF1-270: Brand: CUROS™

## (undated) DEVICE — SOLIDIFIER MEDC 1200ML -- CONVERT TO 356117

## (undated) DEVICE — KENDALL RADIOLUCENT FOAM MONITORING ELECTRODE -RECTANGULAR SHAPE: Brand: KENDALL